# Patient Record
Sex: MALE | Race: WHITE | NOT HISPANIC OR LATINO | URBAN - METROPOLITAN AREA
[De-identification: names, ages, dates, MRNs, and addresses within clinical notes are randomized per-mention and may not be internally consistent; named-entity substitution may affect disease eponyms.]

---

## 2018-04-02 ENCOUNTER — FOLLOW UP ESTABLISHED (OUTPATIENT)
Dept: URBAN - METROPOLITAN AREA CLINIC 30 | Facility: CLINIC | Age: 63
End: 2018-04-02
Payer: COMMERCIAL

## 2018-04-02 DIAGNOSIS — H16.223 KERATOCONJUNCT SICCA, NOT SPECIFIED AS SJOGREN'S, BILATERAL: ICD-10-CM

## 2018-04-02 DIAGNOSIS — H26.492 OTHER SECONDARY CATARACT, LEFT EYE: ICD-10-CM

## 2018-04-02 DIAGNOSIS — E11.9 TYPE 2 DIABETES MELLITUS WITHOUT COMPLICATIONS: Primary | ICD-10-CM

## 2018-04-02 PROCEDURE — 92014 COMPRE OPH EXAM EST PT 1/>: CPT | Performed by: OPTOMETRIST

## 2018-04-02 PROCEDURE — 92134 CPTRZ OPH DX IMG PST SGM RTA: CPT | Performed by: OPTOMETRIST

## 2018-04-02 ASSESSMENT — KERATOMETRY
OD: 43.47
OS: 43.47

## 2018-04-02 ASSESSMENT — VISUAL ACUITY
OS: 20/20
OD: 20/25

## 2020-02-17 ENCOUNTER — FOLLOW UP ESTABLISHED (OUTPATIENT)
Dept: URBAN - METROPOLITAN AREA CLINIC 30 | Facility: CLINIC | Age: 65
End: 2020-02-17
Payer: MEDICARE

## 2020-02-17 DIAGNOSIS — H43.393 OTHER VITREOUS OPACITIES, BILATERAL: ICD-10-CM

## 2020-02-17 DIAGNOSIS — Z79.84 LONG TERM (CURRENT) USE OF ORAL HYPOGLYCEMIC DRUGS: ICD-10-CM

## 2020-02-17 PROCEDURE — 92014 COMPRE OPH EXAM EST PT 1/>: CPT | Performed by: OPTOMETRIST

## 2020-02-17 PROCEDURE — 92134 CPTRZ OPH DX IMG PST SGM RTA: CPT | Performed by: OPTOMETRIST

## 2020-02-17 ASSESSMENT — VISUAL ACUITY
OS: 20/25
OD: 20/25

## 2020-02-17 ASSESSMENT — KERATOMETRY
OD: 43.49
OS: 43.30

## 2020-02-17 ASSESSMENT — INTRAOCULAR PRESSURE
OS: 12
OD: 12

## 2021-06-21 ENCOUNTER — OFFICE VISIT (OUTPATIENT)
Dept: URBAN - METROPOLITAN AREA CLINIC 30 | Facility: CLINIC | Age: 66
End: 2021-06-21
Payer: MEDICARE

## 2021-06-21 DIAGNOSIS — H43.811 VITREOUS DEGENERATION, RIGHT EYE: ICD-10-CM

## 2021-06-21 PROCEDURE — 92134 CPTRZ OPH DX IMG PST SGM RTA: CPT | Performed by: OPTOMETRIST

## 2021-06-21 PROCEDURE — 92014 COMPRE OPH EXAM EST PT 1/>: CPT | Performed by: OPTOMETRIST

## 2021-06-21 ASSESSMENT — KERATOMETRY
OD: 43.30
OS: 43.44

## 2021-06-21 ASSESSMENT — INTRAOCULAR PRESSURE
OD: 12
OS: 8

## 2021-06-21 ASSESSMENT — VISUAL ACUITY
OD: 20/20
OS: 20/20

## 2021-06-21 NOTE — IMPRESSION/PLAN
Impression: Vitreous degeneration, right eye: H43.811. Plan: Pt educ on findings. Retinas are flat and attached OU. Reviewed s/sx of RD and to call asap if occurs. MAC OCT WNL OU-RPE mottling OU.

## 2021-09-30 ENCOUNTER — OFFICE VISIT (OUTPATIENT)
Dept: URBAN - METROPOLITAN AREA CLINIC 30 | Facility: CLINIC | Age: 66
End: 2021-09-30
Payer: MEDICARE

## 2021-09-30 DIAGNOSIS — H43.813 VITREOUS DEGENERATION, BILATERAL: Primary | ICD-10-CM

## 2021-09-30 PROCEDURE — 92134 CPTRZ OPH DX IMG PST SGM RTA: CPT | Performed by: OPTOMETRIST

## 2021-09-30 PROCEDURE — 99213 OFFICE O/P EST LOW 20 MIN: CPT | Performed by: OPTOMETRIST

## 2021-09-30 ASSESSMENT — INTRAOCULAR PRESSURE
OD: 15
OS: 13

## 2021-09-30 NOTE — IMPRESSION/PLAN
Impression: Vitreous degeneration, bilateral: H43.813. Plan: Recent onset OS with flashes/floaters increased. Pt educ on findings. Retinas flat and attached OU. Reviewed signs and symptoms of RD and to call asap if occurs. MAC OCT today.

## 2021-11-09 ENCOUNTER — OFFICE VISIT (OUTPATIENT)
Dept: URBAN - METROPOLITAN AREA CLINIC 30 | Facility: CLINIC | Age: 66
End: 2021-11-09
Payer: MEDICARE

## 2021-11-09 PROCEDURE — 92134 CPTRZ OPH DX IMG PST SGM RTA: CPT | Performed by: OPTOMETRIST

## 2021-11-09 PROCEDURE — 99212 OFFICE O/P EST SF 10 MIN: CPT | Performed by: OPTOMETRIST

## 2021-11-09 ASSESSMENT — INTRAOCULAR PRESSURE
OD: 15
OS: 14

## 2021-11-09 NOTE — IMPRESSION/PLAN
Impression: Vitreous degeneration, bilateral: H43.813. Plan: Flashes have ceased per pt. Floaters less visible OS now (more recent onset PVD OS). Retinas flat and attached OU. Reviewed signs and symptoms of RD and to call asap if occurs. MAC OCT today.

## 2022-06-15 ENCOUNTER — OFFICE VISIT (OUTPATIENT)
Dept: URBAN - METROPOLITAN AREA CLINIC 30 | Facility: CLINIC | Age: 67
End: 2022-06-15
Payer: MEDICARE

## 2022-06-15 DIAGNOSIS — E11.9 TYPE 2 DIABETES MELLITUS WITHOUT COMPLICATIONS: Primary | ICD-10-CM

## 2022-06-15 DIAGNOSIS — H16.223 KERATOCONJUNCTIVITIS SICCA, NOT SPECIFIED AS SJOGREN'S, BILATERAL: ICD-10-CM

## 2022-06-15 DIAGNOSIS — H43.813 VITREOUS DEGENERATION, BILATERAL: ICD-10-CM

## 2022-06-15 DIAGNOSIS — H26.492 OTHER SECONDARY CATARACT, LEFT EYE: ICD-10-CM

## 2022-06-15 DIAGNOSIS — Z79.84 LONG TERM (CURRENT) USE OF ORAL HYPOGLYCEMIC DRUGS: ICD-10-CM

## 2022-06-15 PROCEDURE — 92014 COMPRE OPH EXAM EST PT 1/>: CPT | Performed by: OPTOMETRIST

## 2022-06-15 PROCEDURE — 92134 CPTRZ OPH DX IMG PST SGM RTA: CPT | Performed by: OPTOMETRIST

## 2022-06-15 ASSESSMENT — VISUAL ACUITY
OS: 20/20
OD: 20/20

## 2022-06-15 ASSESSMENT — INTRAOCULAR PRESSURE
OD: 15
OS: 15

## 2022-06-15 ASSESSMENT — KERATOMETRY
OS: 43.35
OD: 43.55

## 2023-06-20 ENCOUNTER — OFFICE VISIT (OUTPATIENT)
Dept: URBAN - METROPOLITAN AREA CLINIC 30 | Facility: CLINIC | Age: 68
End: 2023-06-20
Payer: MEDICARE

## 2023-06-20 DIAGNOSIS — H16.223 KERATOCONJUNCTIVITIS SICCA, NOT SPECIFIED AS SJOGREN'S, BILATERAL: ICD-10-CM

## 2023-06-20 DIAGNOSIS — H26.492 OTHER SECONDARY CATARACT, LEFT EYE: ICD-10-CM

## 2023-06-20 DIAGNOSIS — E11.9 TYPE 2 DIABETES MELLITUS WITHOUT COMPLICATIONS: Primary | ICD-10-CM

## 2023-06-20 DIAGNOSIS — H43.813 VITREOUS DEGENERATION, BILATERAL: ICD-10-CM

## 2023-06-20 PROCEDURE — 92014 COMPRE OPH EXAM EST PT 1/>: CPT

## 2023-06-20 PROCEDURE — 92134 CPTRZ OPH DX IMG PST SGM RTA: CPT

## 2023-06-20 ASSESSMENT — INTRAOCULAR PRESSURE
OD: 15
OS: 15

## 2023-06-20 ASSESSMENT — VISUAL ACUITY
OD: 20/20
OS: 20/20

## 2023-06-20 ASSESSMENT — KERATOMETRY
OS: 43.38
OD: 43.38

## 2023-06-20 NOTE — IMPRESSION/PLAN
Impression: Vitreous degeneration, bilateral: H43.813. Plan: Retinas flat and attached OU. Reviewed signs and symptoms of RD and to call asap if occurs. MAC OCT today-stable.

## 2023-06-20 NOTE — IMPRESSION/PLAN
Impression: Other secondary cataract, left eye: H26.492. Plan: Remains mild. Continue monitor. Clear IOL OD.

## 2023-11-07 NOTE — IMPRESSION/PLAN
Impression: Keratoconjunctivitis sicca, bilateral Plan: Using Systane ATs consistently, BID OU. Cont ATs and avoid fans.
Impression: Long term (current) use of oral antidiabetic drugs Plan: See other diabetic note
Impression: Other secondary cataract, left eye: H26.492. Plan: Remains mild. Continue monitor. Clear IOL OD.
Impression: Type 2 diabetes mellitus without complications Plan: No retinopathy on exam today. Patient educated on importance of BG control. Monitor annually.
Impression: Vitreous degeneration, bilateral: H43.813. Plan: No flashes and floaters are less noticeable. Retinas flat and attached OU. Reviewed signs and symptoms of RD and to call asap if occurs. MAC OCT today-stable.
(V5) oriented

## 2024-01-05 ENCOUNTER — OFFICE VISIT (OUTPATIENT)
Dept: URBAN - METROPOLITAN AREA CLINIC 30 | Facility: CLINIC | Age: 69
End: 2024-01-05
Payer: MEDICARE

## 2024-01-05 DIAGNOSIS — H10.521 ANGULAR BLEPHAROCONJUNCTIVITIS, RIGHT EYE: Primary | ICD-10-CM

## 2024-01-05 PROCEDURE — 99214 OFFICE O/P EST MOD 30 MIN: CPT

## 2024-01-05 RX ORDER — NEOMYCIN SULFATE, POLYMYXIN B SULFATE AND DEXAMETHASONE 3.5; 10000; 1 MG/G; [USP'U]/G; MG/G
OINTMENT OPHTHALMIC
Qty: 5 | Refills: 3 | Status: ACTIVE
Start: 2024-01-05

## 2024-01-05 ASSESSMENT — INTRAOCULAR PRESSURE
OD: 19
OS: 18

## 2024-08-27 ENCOUNTER — OFFICE VISIT (OUTPATIENT)
Dept: URBAN - METROPOLITAN AREA CLINIC 30 | Facility: CLINIC | Age: 69
End: 2024-08-27
Payer: MEDICARE

## 2024-08-27 DIAGNOSIS — Z79.84 LONG TERM (CURRENT) USE OF ORAL HYPOGLYCEMIC DRUGS: ICD-10-CM

## 2024-08-27 DIAGNOSIS — H43.813 VITREOUS DEGENERATION, BILATERAL: ICD-10-CM

## 2024-08-27 DIAGNOSIS — E11.9 TYPE 2 DIABETES MELLITUS WITHOUT COMPLICATIONS: Primary | ICD-10-CM

## 2024-08-27 DIAGNOSIS — D23.112 OTHER BENIGN NEOPLASM OF SKIN OF RT LOWER EYELID, INCLUDING CANTHUS: ICD-10-CM

## 2024-08-27 PROCEDURE — 92014 COMPRE OPH EXAM EST PT 1/>: CPT | Performed by: OPTOMETRIST

## 2024-08-27 PROCEDURE — 92134 CPTRZ OPH DX IMG PST SGM RTA: CPT | Performed by: OPTOMETRIST

## 2024-08-27 ASSESSMENT — VISUAL ACUITY
OD: 20/25
OS: 20/20

## 2024-08-27 ASSESSMENT — KERATOMETRY
OD: 43.49
OS: 43.34

## 2024-08-27 ASSESSMENT — INTRAOCULAR PRESSURE
OS: 16
OD: 16

## 2025-08-27 ENCOUNTER — OFFICE VISIT (OUTPATIENT)
Dept: URBAN - METROPOLITAN AREA CLINIC 30 | Facility: CLINIC | Age: 70
End: 2025-08-27
Payer: MEDICARE

## 2025-08-27 DIAGNOSIS — H16.223 KERATOCONJUNCT SICCA, NOT SPECIFIED AS SJOGREN'S, BILATERAL: ICD-10-CM

## 2025-08-27 DIAGNOSIS — Z79.84 LONG TERM (CURRENT) USE OF ORAL HYPOGLYCEMIC DRUGS: ICD-10-CM

## 2025-08-27 DIAGNOSIS — H26.492 OTHER SECONDARY CATARACT, LEFT EYE: ICD-10-CM

## 2025-08-27 DIAGNOSIS — E11.9 TYPE 2 DIABETES MELLITUS WITHOUT COMPLICATIONS: Primary | ICD-10-CM

## 2025-08-27 DIAGNOSIS — H43.813 VITREOUS DEGENERATION, BILATERAL: ICD-10-CM

## 2025-08-27 DIAGNOSIS — D23.112 OTHER BENIGN NEOPLASM OF SKIN OF RT LOWER EYELID, INCLUDING CANTHUS: ICD-10-CM

## 2025-08-27 PROCEDURE — 92134 CPTRZ OPH DX IMG PST SGM RTA: CPT | Performed by: OPTOMETRIST

## 2025-08-27 PROCEDURE — 92014 COMPRE OPH EXAM EST PT 1/>: CPT | Performed by: OPTOMETRIST

## 2025-08-27 ASSESSMENT — KERATOMETRY
OD: 42.63
OS: 43.13

## 2025-08-27 ASSESSMENT — INTRAOCULAR PRESSURE
OD: 16
OS: 14

## 2025-08-27 ASSESSMENT — VISUAL ACUITY
OD: 20/25
OS: 20/20